# Patient Record
Sex: MALE | URBAN - METROPOLITAN AREA
[De-identification: names, ages, dates, MRNs, and addresses within clinical notes are randomized per-mention and may not be internally consistent; named-entity substitution may affect disease eponyms.]

---

## 2020-09-30 ENCOUNTER — OFFICE VISIT (OUTPATIENT)
Dept: URBAN - METROPOLITAN AREA CLINIC 72 | Facility: CLINIC | Age: 80
End: 2020-09-30
Payer: MEDICARE

## 2020-09-30 DIAGNOSIS — H35.371 PUCKERING OF MACULA, RIGHT EYE: Primary | ICD-10-CM

## 2020-09-30 PROCEDURE — 92134 CPTRZ OPH DX IMG PST SGM RTA: CPT | Performed by: OPTOMETRIST

## 2020-09-30 PROCEDURE — 92004 COMPRE OPH EXAM NEW PT 1/>: CPT | Performed by: OPTOMETRIST

## 2020-09-30 ASSESSMENT — INTRAOCULAR PRESSURE
OS: 16
OD: 15

## 2020-09-30 NOTE — IMPRESSION/PLAN
Impression: Puckering of macula, right eye: H35.371.  Plan: Observe for now patient to watch for vision changes

## 2021-02-10 ENCOUNTER — OFFICE VISIT (OUTPATIENT)
Dept: URBAN - METROPOLITAN AREA CLINIC 72 | Facility: CLINIC | Age: 81
End: 2021-02-10
Payer: COMMERCIAL

## 2021-02-10 DIAGNOSIS — H52.223 REGULAR ASTIGMATISM, BILATERAL: Primary | ICD-10-CM

## 2021-02-10 PROCEDURE — 92012 INTRM OPH EXAM EST PATIENT: CPT | Performed by: OPTOMETRIST

## 2021-02-10 RX ORDER — ESZOPICLONE 2 MG/1
2 MG TABLET, COATED ORAL
Qty: 0 | Refills: 0 | Status: ACTIVE
Start: 2021-02-10

## 2021-02-10 RX ORDER — INSULIN ASPART 100 [IU]/ML
INJECTION, SOLUTION INTRAVENOUS; SUBCUTANEOUS
Qty: 0 | Refills: 0 | Status: INACTIVE
Start: 2021-02-10 | End: 2022-03-23

## 2021-02-10 ASSESSMENT — VISUAL ACUITY
OD: 20/30
OS: 20/20

## 2021-02-10 ASSESSMENT — INTRAOCULAR PRESSURE
OD: 13
OS: 12

## 2021-02-10 NOTE — IMPRESSION/PLAN
Impression: Regular astigmatism, bilateral: H52.223. Plan: A glasses Rx has been generated and dispensed. Pt to call with any concerns.

## 2021-03-24 ENCOUNTER — OFFICE VISIT (OUTPATIENT)
Dept: URBAN - METROPOLITAN AREA CLINIC 72 | Facility: CLINIC | Age: 81
End: 2021-03-24
Payer: MEDICARE

## 2021-03-24 PROCEDURE — 92134 CPTRZ OPH DX IMG PST SGM RTA: CPT | Performed by: OPTOMETRIST

## 2021-03-24 PROCEDURE — 99213 OFFICE O/P EST LOW 20 MIN: CPT | Performed by: OPTOMETRIST

## 2021-03-24 ASSESSMENT — INTRAOCULAR PRESSURE
OS: 11
OD: 9

## 2021-03-24 NOTE — IMPRESSION/PLAN
Impression: Type 2 diabetes mellitus w/o complication: E59.1. Plan: Diabetes type II: no background retinopathy, no signs of neovascularization noted. Discussed ocular and systemic benefits of blood sugar control.

## 2021-03-24 NOTE — IMPRESSION/PLAN
Impression: Puckering of macula, right eye: H35.371. OCT ordered and done today. Stable, with RPE clump  Plan: Discussed diagnosis in detail with patient. No treatment is required at this time. Will continue to observe condition and or symptoms.

## 2022-03-23 ENCOUNTER — OFFICE VISIT (OUTPATIENT)
Dept: URBAN - METROPOLITAN AREA CLINIC 72 | Facility: CLINIC | Age: 82
End: 2022-03-23
Payer: MEDICARE

## 2022-03-23 DIAGNOSIS — E11.9 TYPE 2 DIABETES MELLITUS W/O COMPLICATION: Primary | ICD-10-CM

## 2022-03-23 DIAGNOSIS — H43.813 VITREOUS DEGENERATION, BILATERAL: ICD-10-CM

## 2022-03-23 DIAGNOSIS — H04.123 DRY EYE SYNDROME OF BILATERAL LACRIMAL GLANDS: ICD-10-CM

## 2022-03-23 PROCEDURE — 99213 OFFICE O/P EST LOW 20 MIN: CPT

## 2022-03-23 ASSESSMENT — INTRAOCULAR PRESSURE
OD: 13
OS: 14

## 2022-03-23 NOTE — IMPRESSION/PLAN
Impression: Type 2 diabetes mellitus w/o complication: Z36.6. Plan: Diabetes type II: no background retinopathy, no signs of neovascularization noted. Discussed ocular and systemic benefits of blood sugar control.

## 2022-03-23 NOTE — IMPRESSION/PLAN
Impression: Puckering of macula, right eye: H35.371. ERM OD no holes tears or hemes Plan: Discussed diagnosis in detail with patient. Discussed treatment options with patient, though treatment is not required or recommended at this time. Will continue to observe condition and or symptoms. Reassured patient of current condition and treatment.

## 2022-05-04 ENCOUNTER — OFFICE VISIT (OUTPATIENT)
Dept: URBAN - METROPOLITAN AREA CLINIC 72 | Facility: CLINIC | Age: 82
End: 2022-05-04
Payer: COMMERCIAL

## 2022-05-04 DIAGNOSIS — H52.223 REGULAR ASTIGMATISM, BILATERAL: Primary | ICD-10-CM

## 2022-05-04 PROCEDURE — 92012 INTRM OPH EXAM EST PATIENT: CPT

## 2022-05-04 ASSESSMENT — VISUAL ACUITY
OS: 20/20
OD: 20/20

## 2022-05-04 ASSESSMENT — INTRAOCULAR PRESSURE
OS: 12
OD: 11

## 2022-05-04 NOTE — IMPRESSION/PLAN
Impression: Regular astigmatism, bilateral: H52.223. Plan: Dispensed new glasses Rx today and discussed changes and possible adaptation period. Patient to call if any issues with new glasses occur.

## 2023-04-05 ENCOUNTER — OFFICE VISIT (OUTPATIENT)
Dept: URBAN - METROPOLITAN AREA CLINIC 72 | Facility: CLINIC | Age: 83
End: 2023-04-05
Payer: MEDICARE

## 2023-04-05 DIAGNOSIS — H43.813 VITREOUS DEGENERATION, BILATERAL: ICD-10-CM

## 2023-04-05 DIAGNOSIS — E11.9 TYPE 2 DIABETES MELLITUS W/O COMPLICATION: Primary | ICD-10-CM

## 2023-04-05 DIAGNOSIS — H35.3111 NONEXUDATIVE AGE-RELATED MACULAR DEGENERATION OF RIGHT EYE, EARLY DRY STAGE: ICD-10-CM

## 2023-04-05 DIAGNOSIS — H35.371 PUCKERING OF MACULA, RIGHT EYE: ICD-10-CM

## 2023-04-05 PROCEDURE — 92134 CPTRZ OPH DX IMG PST SGM RTA: CPT

## 2023-04-05 PROCEDURE — 99213 OFFICE O/P EST LOW 20 MIN: CPT

## 2023-04-05 ASSESSMENT — INTRAOCULAR PRESSURE
OD: 15
OS: 15

## 2023-04-05 NOTE — IMPRESSION/PLAN
Impression: Type 2 diabetes mellitus w/o complication: Z88.2. Plan: Diabetes type II: no background retinopathy, no signs of neovascularization noted. Discussed ocular and systemic benefits of blood sugar control.

## 2023-04-05 NOTE — IMPRESSION/PLAN
Impression: Puckering of macula, right eye: H35.371. OCT ordered and performed today
few drusen and ERM OD WNL OS Plan: Discussed diagnosis in detail with patient. Discussed treatment options with patient, though treatment is not required or recommended at this time. Will continue to observe condition and or symptoms. Reassured patient of current condition and treatment.

## 2023-04-05 NOTE — IMPRESSION/PLAN
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 Plan: Discussed DX and possible outcomes, including possibilities of progression and possible [facility] outcomes, with pt. Explained that there is no treatment for Dry AMD, though there is a supplement that can help slow the progression of it. Educated pt on AREDS, and explained that studies show that AREDS only helped slow the progression in those pt that have intermediate stage AMD. Due to stage of AMD do not recommend starting vitamins at this time, but do recommend sunglasses outside, along with no smoking and having a good healthy diet. Will have pt RTC yearly for DFE and OCT to monitor Pt voices understanding.

## 2023-05-03 ENCOUNTER — OFFICE VISIT (OUTPATIENT)
Dept: URBAN - METROPOLITAN AREA CLINIC 72 | Facility: CLINIC | Age: 83
End: 2023-05-03
Payer: COMMERCIAL

## 2023-05-03 DIAGNOSIS — H52.223 REGULAR ASTIGMATISM, BILATERAL: Primary | ICD-10-CM

## 2023-05-03 PROCEDURE — 92014 COMPRE OPH EXAM EST PT 1/>: CPT

## 2023-05-03 ASSESSMENT — INTRAOCULAR PRESSURE
OS: 16
OD: 16

## 2023-05-03 ASSESSMENT — VISUAL ACUITY
OD: 20/20
OS: 20/20

## 2023-05-03 NOTE — IMPRESSION/PLAN
Impression: Regular astigmatism, bilateral: H52.223. Plan: Dispensed new glasses Rx today and discussed changes and possible adaptation period. Patient to call if any issues with new glasses occur.
yes

## 2024-04-08 ENCOUNTER — OFFICE VISIT (OUTPATIENT)
Dept: URBAN - METROPOLITAN AREA CLINIC 72 | Facility: CLINIC | Age: 84
End: 2024-04-08
Payer: MEDICARE

## 2024-04-08 DIAGNOSIS — H43.813 VITREOUS DEGENERATION, BILATERAL: ICD-10-CM

## 2024-04-08 DIAGNOSIS — H35.373 PUCKERING OF MACULA, BILATERAL: ICD-10-CM

## 2024-04-08 DIAGNOSIS — E11.9 TYPE 2 DIABETES MELLITUS W/O COMPLICATION: Primary | ICD-10-CM

## 2024-04-08 DIAGNOSIS — D31.32 BENIGN NEOPLASM OF LEFT CHOROID: ICD-10-CM

## 2024-04-08 PROCEDURE — 99213 OFFICE O/P EST LOW 20 MIN: CPT | Performed by: OPTOMETRIST

## 2024-04-08 ASSESSMENT — INTRAOCULAR PRESSURE
OD: 13
OS: 14